# Patient Record
Sex: FEMALE | Race: WHITE | Employment: FULL TIME | URBAN - NONMETROPOLITAN AREA
[De-identification: names, ages, dates, MRNs, and addresses within clinical notes are randomized per-mention and may not be internally consistent; named-entity substitution may affect disease eponyms.]

---
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
## 2023-05-22 ENCOUNTER — OFFICE VISIT (OUTPATIENT)
Dept: URGENT CARE | Facility: PHYSICIAN GROUP | Age: 18
End: 2023-05-22
Payer: COMMERCIAL

## 2023-05-22 VITALS
DIASTOLIC BLOOD PRESSURE: 78 MMHG | HEIGHT: 62 IN | SYSTOLIC BLOOD PRESSURE: 118 MMHG | WEIGHT: 166 LBS | HEART RATE: 88 BPM | TEMPERATURE: 98 F | BODY MASS INDEX: 30.55 KG/M2 | OXYGEN SATURATION: 97 % | RESPIRATION RATE: 14 BRPM

## 2023-05-22 DIAGNOSIS — M77.8 DELTOID TENDINITIS OF LEFT SHOULDER: ICD-10-CM

## 2023-05-22 PROCEDURE — 3078F DIAST BP <80 MM HG: CPT | Performed by: NURSE PRACTITIONER

## 2023-05-22 PROCEDURE — 3074F SYST BP LT 130 MM HG: CPT | Performed by: NURSE PRACTITIONER

## 2023-05-22 PROCEDURE — 99203 OFFICE O/P NEW LOW 30 MIN: CPT | Performed by: NURSE PRACTITIONER

## 2023-05-22 RX ORDER — NAPROXEN 500 MG/1
500 TABLET ORAL 2 TIMES DAILY WITH MEALS
Qty: 30 TABLET | Refills: 0 | Status: SHIPPED | OUTPATIENT
Start: 2023-05-22 | End: 2023-05-29

## 2023-05-22 RX ORDER — LIDOCAINE 4 G/G
1 PATCH TOPICAL
Qty: 30 PATCH | Refills: 0 | Status: SHIPPED | OUTPATIENT
Start: 2023-05-22 | End: 2023-05-29

## 2023-05-22 NOTE — LETTER
[facility] [place]  [address] [facility] [address]     May 22, 2023    Patient: Cathleen Dean   YOB: 2005   Date of Visit: 5/22/2023       To Whom It May Concern:    Cathleen Dean was seen and treated in our department on 5/22/2023.     Sincerely,     TIERA Payne.

## 2023-05-22 NOTE — PROGRESS NOTES
Patient has consented to treatment and for use of patient information for treatment and billing purposes.    Chief Complaint:    Chief Complaint   Patient presents with    Shoulder Pain     X2 months R shoulder pain, thought she was just sore form work, but never got better, feels stiff, 8/10 pain, not able to lift above elbow, not going through WC         History of Present Illness: 18 y.o.  female presents to clinic with 2-month history of worsening right shoulder/deltoid pain.  States that her pain level is an 8/10, that is intermittent, and is described as tightness, sore and aching.  Pain is exacerbated with abduction of arm lateral from body, and with lifting.  She does occasionally hear a popping sound in her shoulder if she lifts it above the height of her shoulder.  She has been taking 4 to 800 mg of ibuprofen which does help with the pain intermittently.  She did go to work right and did have a lidocaine patch and taping placed which did help with her pain.  She states that this is not a work-related injury.    She denies any neck pain, any numbness or tingling running down her arm, or loss of  strength        Medications, Allergies, and current problem list reviewed today in Epic.    Physical Exam:    Vitals:    05/22/23 1110   BP: 118/78   Pulse: 88   Resp: 14   Temp: 36.7 °C (98 °F)   SpO2: 97%             Physical Exam  Constitutional:       General: She is not in acute distress.     Appearance: She is not ill-appearing or toxic-appearing.   HENT:      Head: Normocephalic.      Nose: Nose normal.      Mouth/Throat:      Mouth: Mucous membranes are moist.   Eyes:      Extraocular Movements: Extraocular movements intact.      Conjunctiva/sclera: Conjunctivae normal.      Pupils: Pupils are equal, round, and reactive to light.   Cardiovascular:      Rate and Rhythm: Normal rate.   Pulmonary:      Effort: Pulmonary effort is normal.   Musculoskeletal:         General: Normal range of motion.       Cervical back: Normal range of motion and neck supple.      Comments: Right Shoulder: decreased ROM with abduction due to pain, full strength,  strength 5/5 bilaterally, sensation intact, empty can test negative.  No pain with palpation to anterior shoulder joint or deltoid muscle.     Skin:     General: Skin is warm and dry.   Neurological:      General: No focal deficit present.      Mental Status: She is alert and oriented to person, place, and time.   Psychiatric:         Mood and Affect: Mood normal.         Behavior: Behavior normal.          Medical Decision Making and Plan:  I personally reviewed prior external notes and test results pertinent to today's visit.   Shared decision-making was utilized with patient did develop treatment plan and clinic course.     Luis Enrique, 18 y.o. female, presents to clinic today with right shoulder pain for 2 months that seems to be worsening.  Patient's physical exam is overall unremarkable.  We will go ahead and try naproxen and lidocaine.  Referral placed to physical therapy.  Referral also placed to establish with primary care provider.  Recommended cryotherapy.  Patient to continue with taping to add additional support.    Differential diagnosis, natural history, supportive care, and indications for immediate follow-up discussed.    The patient remained stable during the urgent care visit.          1. Deltoid tendinitis of left shoulder  - naproxen (NAPROSYN) 500 MG Tab; Take 1 Tablet by mouth 2 times a day with meals.  Dispense: 30 Tablet; Refill: 0  - Lidocaine 4 % Patch; Apply 1 Application topically every 24 hours as needed (Right shoulder pain).  Dispense: 30 Patch; Refill: 0  - Referral to Physical Therapy  - Referral to establish with [facility] PCP    Patient was encouraged to monitor symptoms closely. Those signs and symptoms which would warrant concern and mandate seeking a higher level of service through the emergency department discussed at length.  Patient  stated agreement and understanding of this plan of care.    Follow up:  Advised the patient to follow-up with the primary care physician for recheck, reevaluation, and consideration of further management.  Patient verbalized understanding of treatment plan and has no further questions regarding care.      Disposition:  Home in stable condition       Voice Recognition Disclaimer:  Portions of this document were created using voice recognition software. The software does have a chance of producing errors of grammar and possibly content. I have made every reasonable attempt to correct obvious errors, but there may be errors of grammar and possibly content that I did not discover before finalizing the documentation.

## 2023-05-25 ENCOUNTER — TELEPHONE (OUTPATIENT)
Dept: HEALTH INFORMATION MANAGEMENT | Facility: OTHER | Age: 18
End: 2023-05-25

## 2023-05-29 ENCOUNTER — OCCUPATIONAL MEDICINE (OUTPATIENT)
Dept: URGENT CARE | Facility: PHYSICIAN GROUP | Age: 18
End: 2023-05-29
Payer: COMMERCIAL

## 2023-05-29 VITALS
OXYGEN SATURATION: 99 % | SYSTOLIC BLOOD PRESSURE: 102 MMHG | WEIGHT: 166 LBS | DIASTOLIC BLOOD PRESSURE: 68 MMHG | RESPIRATION RATE: 14 BRPM | HEART RATE: 98 BPM | HEIGHT: 62 IN | TEMPERATURE: 97.1 F | BODY MASS INDEX: 30.55 KG/M2

## 2023-05-29 DIAGNOSIS — M77.8 DELTOID TENDINITIS OF LEFT SHOULDER: ICD-10-CM
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
## 2023-05-29 PROCEDURE — 99203 OFFICE O/P NEW LOW 30 MIN: CPT | Performed by: NURSE PRACTITIONER

## 2023-05-29 PROCEDURE — 3074F SYST BP LT 130 MM HG: CPT | Performed by: NURSE PRACTITIONER

## 2023-05-29 PROCEDURE — 3078F DIAST BP <80 MM HG: CPT | Performed by: NURSE PRACTITIONER

## 2023-05-29 RX ORDER — LIDOCAINE 50 MG/G
1 PATCH TOPICAL EVERY 24 HOURS
Qty: 10 PATCH | Refills: 0 | Status: SHIPPED | OUTPATIENT
Start: 2023-05-29 | End: 2023-06-27

## 2023-05-29 NOTE — LETTER
"EMPLOYEE’S CLAIM FOR COMPENSATION/ REPORT OF INITIAL TREATMENT  FORM C-4  PLEASE TYPE OR PRINT    EMPLOYEE’S CLAIM - PROVIDE ALL INFORMATION REQUESTED   First Name  Cathleen Last Name  [place] Birthdate                    2005                Sex  female Claim Number (Insurer’s Use Only)   Home Address  [address] Age  18 y.o. Height  1.575 m (5' 2\") Weight  75.3 kg (166 lb) [facility]     [facility] [place]  [address] Telephone  [phone] (home)    Mailing Address  [address] [facility]  [address] Primary Language Spoken  English    INSURER  na THIRD-PARTY     [employer]   Employee's Occupation (Job Title) When Injury or Occupational Disease Occurred  PRODUCTION    Employer's Name/Company Name  [employer]  Telephone  [phone]    Office Mail Address (Number and Street)  [address]        Date of Injury  3/17/2023               Hours Injury  1:30 PM Date Employer Notified  5/25/2023 Last Day of Work after Injury or Occupational Disease  5/27/2023 Supervisor to Whom Injury     Reported  ANTONIO TOLENTINO   Address or Location of Accident (if applicable)  Work [1]   What were you doing at the time of accident? (if applicable)  LIFTING A DOOR ON MY OWN    How did this injury or occupational disease occur? (Be specific and answer in detail. Use additional sheet if necessary)  WORKING WITH A MACHINE, I OPENED THE DOOR TO IT THAT SLIDES UP. THE DOOR WAS REALLY HEAVY AND I WAS LIFTING IT BY MYSELF   If you believe that you have an occupational disease, when did you first have knowledge of the disability and its relationship to your employment?  NA Witnesses to the Accident (if applicable)  NA      Nature of Injury or Occupational Disease  Workers' Compensation  Part(s) of Body Injured or Affected  Shoulder (R), Defer, Defer    I CERTIFY THAT THE ABOVE IS TRUE AND CORRECT TO T HE BEST OF MY " KNOWLEDGE AND THAT I HAVE PROVIDED THIS INFORMATION IN ORDER TO OBTAIN THE BENEFITS OF [place], OR [place]).  I HEREBY AUTHORIZE ANY PHYSICIAN, CHIROPRACTOR, SURGEON, PRACTITIONER OR ANY OTHER PERSON, ANY HOSPITAL, INCLUDING [facility] OR [facility], ANY  MEDICAL SERVICE ORGANIZATION, ANY INSURANCE COMPANY, OR OTHER INSTITUTION OR ORGANIZATION TO RELEASE TO EACH OTHER, ANY MEDICAL OR OTHER INFORMATION, INCLUDING BENEFITS PAID OR PAYABLE, PERTINENT TO THIS INJURY OR DISEASE, EXCEPT INFORMATION RELATIVE TO DIAGNOSIS, TREATMENT AND/OR COUNSELING FOR AIDS, PSYCHOLOGICAL CONDITIONS, ALCOHOL OR CONTROLLED SUBSTANCES, FOR WHICH I MUST GIVE SPECIFIC AUTHORIZATION.  A PHOTOSTAT OF THIS AUTHORIZATION SHALL BE VALID AS THE ORIGINAL.       Date  05/29/2023   [facility] Employee’s Original or  *Electronic Signature   THIS REPORT MUST BE COMPLETED AND MAILED WITHIN 3 WORKING DAYS OF TREATMENT   [facility] [place]  Name of Facility  [facility]   Date  5/29/2023 Diagnosis and Description of Injury or Occupational Disease  (M77.8) Deltoid tendinitis of left shoulder  (Y99.0) Work related injury Is there evidence that the injured employee was under the influence of alcohol and/or another controlled substance at the time of accident?  ? No ? Yes (if yes, please explain)   Hour  10:43 AM   Diagnoses of Deltoid tendinitis of left shoulder and Work related injury were pertinent to this visit. No   Treatment  RICE, alternating lidocaine patch with Voltaren gel.  Recommend gentle range of motion exercises as tolerated.  Referral to occupational health due to prolonged injury.  Have you advised the patient to remain off work five days or     more?    X-Ray Findings      ? Yes Indicate dates:   From   To      From information given by the employee, together with medical evidence, can        you directly  "connect this injury or occupational disease as job incurred?  Yes ? No If no, is the injured employee capable of:  ? full duty  No ? modified duty      Is additional medical care by a physician indicated?  Yes If modified duty, specify any limitations / restrictions  See D39   Do you know of any previous injury or disease contributing to this condition or occupational disease?  ? Yes ? No (Explain if yes)                          No   Date  5/29/2023 Print Health Care Provider's  Name  FOX Armstrong I certify that the employer’s copy of  this form was delivered to the employer on:   Address  [address] Insurer’s Use Only     [place] Zip  [address]    Provider’s Tax ID Number  [id number] Telephone  Dept: [phone]             Health Care Provider’s Original or Electronic Signature  e-JESSICA Huynh Degree (MD,DO, DC,PA-C,APRN)  APRN      * Complete and attach Release of Information (Form C-4A) when injured employee signs C-4 Form electronically  ORIGINAL - TREATING HEALTHCARE PROVIDER PAGE 2 - INSURER/TPA PAGE 3 - EMPLOYER PAGE 4 - EMPLOYEE             Form C-4 (rev.08/21)           BRIEF DESCRIPTION OF RIGHTS AND BENEFITS  (Pursuant to [place] 616C.050)    Notice of Injury or Occupational Disease (Incident Report Form C-1): If an injury or occupational disease (OD) arises out of and in the course of employment, you must provide written notice to your employer as soon as practicable, but no later than 7 days after the accident or OD. Your employer shall maintain a sufficient supply of the required forms.    Claim for Compensation (Form C-4): If medical treatment is sought, the form C-4 is available at the place of initial treatment. A completed \"Claim for Compensation\" (Form C-4) must be filed within 90 days after an accident or OD. The treating physician or chiropractor must, within 3 working days after treatment, complete and mail to the employer, the employer's " insurer and third-party , the Claim for Compensation.    Medical Treatment: If you require medical treatment for your on-the-job injury or OD, you may be required to select a physician or chiropractor from a list provided by your workers’ compensation insurer, if it has contracted with an Organization for Managed Care (MCO) or Preferred Provider Organization (PPO) or providers of health care. If your employer has not entered into a contract with an MCO or PPO, you may select a physician or chiropractor from the Panel of Physicians and Chiropractors. Any medical costs related to your industrial injury or OD will be paid by your insurer.    Temporary Total Disability (TTD): If your doctor has certified that you are unable to work for a period of at least 5 consecutive days, or 5 cumulative days in a 20-day period, or places restrictions on you that your employer does not accommodate, you may be entitled to TTD compensation.    Temporary Partial Disability (TPD): If the wage you receive upon reemployment is less than the compensation for TTD to which you are entitled, the insurer may be required to pay you TPD compensation to make up the difference. TPD can only be paid for a maximum of 24 months.    Permanent Partial Disability (PPD): When your medical condition is stable and there is an indication of a PPD as a result of your injury or OD, within 30 days, your insurer must arrange for an evaluation by a rating physician or chiropractor to determine the degree of your PPD. The amount of your PPD award depends on the date of injury, the results of the PPD evaluation, your age and wage.    Permanent Total Disability (PTD): If you are medically certified by a treating physician or chiropractor as permanently and totally disabled and have been granted a PTD status by your insurer, you are entitled to receive monthly benefits not to exceed 66 2/3% of your average monthly wage. The amount of your PTD payments  is subject to reduction if you previously received a lump-sum PPD award.    Vocational Rehabilitation Services: You may be eligible for vocational rehabilitation services if you are unable to return to the job due to a permanent physical impairment or permanent restrictions as a result of your injury or occupational disease.    Transportation and Per Igor Reimbursement: You may be eligible for travel expenses and per igor associated with medical treatment.    Reopening: You may be able to reopen your claim if your condition worsens after claim closure.     Appeal Process: If you disagree with a written determination issued by the insurer or the insurer does not respond to your request, you may appeal to the Department of Administration, , by following the instructions contained in your determination letter. You must appeal the determination within 70 days from the date of the determination letter at [address], [address], [place], [place] [address], or [address], [address], [place], [place] [address]. If you disagree with the  decision, you may appeal to the Department of Administration, . You must file your appeal within 30 days from the date of the  decision letter at [address], [address], [place], [place] [address], or [address], [address], [place], [place] [address]. If you disagree with a decision of an , you may file a petition for judicial review with the District Court. You must do so within 30 days of the Appeal Officer’s decision. You may be represented by an  at your own expense or you may contact the [facility] for possible representation.    [place]  for Injured Workers (NAIW): If you disagree with a  decision, you may request that NAIW represent you without charge at an  Hearing. For information regarding denial of benefits, you may contact the [facility]  at: [address], [address], [place] [address], [phone], or [address] [facility], [address], [place] [address], [phone]    To File a Complaint with the Division: If you wish to file a complaint with the  of the Division of Industrial Relations (DIR),  please contact the Workers’ Compensation Section, [address], [address], [place], [place] [address], telephone [phone], or [address], [address], [place], [place] [address], telephone [phone].    For assistance with Workers’ Compensation Issues: You may contact the [facility] Office for Consumer Health Assistance, [address], [facility] [address], [place] [address], Toll Free 1-478.317.2287, Web site: http://[facility].[place].gov/Programs/PETRA E-mail: petra@[facility].nv.gov              05/29/2023            __________________________________________________________________                                    _________________            Employee Name / Signature                                                                                                                            Date                                                                                                                                                                                                                              D-2 (rev. 10/20)

## 2023-05-29 NOTE — LETTER
[facility]  [place] [address]  Phone:  [phone] - Fax:  [phone]   [facility] Progress Report and Disability Certification  Date of Service: 5/29/2023   No Show:  No  Date / Time of Next Visit: 6/9/2023   Claim Information   Patient Name: Cathleen Dean  Claim Number:     Employer: [employer]  Date of Injury: 3/17/2023     Insurer / TPA: [employer]  ID / SSN:     Occupation: PRODUCTION  Diagnosis: Diagnoses of Deltoid tendinitis of left shoulder and Work related injury were pertinent to this visit.    Medical Information   Related to Industrial Injury? Yes    Subjective Complaints:  DOI: approx 2-3 months ago:  First visit on 5/23: (did not claim WC at this time)  18 y.o.  female presents to clinic with 2-month history of worsening right shoulder/deltoid pain.  States that her pain level is an 8/10, that is intermittent, and is described as tightness, sore and aching.  Pain is exacerbated with abduction of arm lateral from body, and with lifting.  She does occasionally hear a popping sound in her shoulder if she lifts it above the height of her shoulder.  She has been taking 4 to 800 mg of ibuprofen which does help with the pain intermittently.  She did go to work right and did have a lidocaine patch and taping placed which did help with her pain.  She states that this is not a work-related injury.  She denies any neck pain, any numbness or tingling running down her arm, or loss of  strength.    FUV (claiming worker's comp) on 5/29: Patient returns for follow-up office visit.  States that this is a workers comp injury, due to lifting up a heavy door without assistance repeatedly during several of her work shifts.  States that her pain is approximately the same since his visit on 5/23.  Reports worsening pain with forward flexion and abduction.  Tried Salonpas patch which was not helpful, reports that naproxen made her nauseated  despite eating while taking medication.  Used a TENS unit at home yesterday, with moderate relief.  Has been working light duty at work, and tolerating it well.  Has had prior referral to physical therapy.  Denies numbness, tingling, loss of sensation, decreased  strength.  Patient is right-handed.    PMH: No pertinent past medical history to this problem   MEDS: Medications were reviewed in Epic   ALLERGIES: Allergies were reviewed in Epic   SOCHX: Reviewed in Epic  FH: No pertinent family history to this problem        Objective Findings: Right Shoulder: No visible or palpable signs of deformity; decreased ROM with forward flexion, lateral abduction due to pain; full strength 5/5 bilaterally, sensation intact, minimal pain with palpation to anterior shoulder joint and deltoid muscle   Pre-Existing Condition(s):     Assessment:   Initial Visit    Status: Additional Care Required  Permanent Disability:No    Plan: Medication    Diagnostics:      Comments:       Disability Information   Status: Released to Restricted Duty    From:  5/29/2023  Through: 6/9/2023 Restrictions are: Temporary   Physical Restrictions   Sitting:    Standing:    Stooping:    Bending:      Squatting:    Walking:    Climbing:    Pushing:  < or = to 2 hrs/day   Pulling:  < or = to 2 hrs/day Other:    Reaching Above Shoulder (L):   Reaching Above Shoulder (R):       Reaching Below Shoulder (L):    Reaching Below Shoulder (R):  < or = to 1 hrs/day   Not to exceed Weight Limits   Carrying(hrs):   Weight Limit(lb): < or = to 10 pounds Lifting(hrs):   Weight  Limit(lb): < or = to 10 pounds   Comments: Refer to [facility] ([place]). Restriction applied to right upper extremity only.  Trial lidocaine patch alternating with Voltaren gel.  Recommend OTC Tylenol and gentle range of motion exercises as tolerated.    Repetitive Actions   Hands: i.e. Fine Manipulations from Grasping:     Feet: i.e. Operating Foot Controls:     Driving / Operate Machinery:      Health Care Provider’s Original or Electronic Signature  TIERA Armstrong. Health Care Provider’s Original or Electronic Signature    Justen Clifford DO MPH     Clinic Name / Location: [address] [address] Clinic Phone Number: Dept: [phone]   Appointment Time: 10:30 Am Visit Start Time: 10:43 AM   Check-In Time:  10:21 Am Visit Discharge Time:  11:37 am    Original-Treating Physician or Chiropractor    Page 2-Insurer/TPA    Page 3-Employer    Page 4-Employee

## 2023-05-29 NOTE — PROGRESS NOTES
Subjective:   Cathleen Dean is a 18 y.o. female who presents for Shoulder Injury (WC follow up, R shoulder seems to be worse, 9/10 pain more constant, pain radiates down whole arm at times and sometimes just in the shoulder, injury happened while lifting heavy door, not able to raise arm up, stopped taking naproxen as it made her nauseous  but did not seem to help with pain.)      HPI  DOI: approx 2-3 months ago:  First visit on 5/23: (did not claim  at this time)  18 y.o.  female presents to clinic with 2-month history of worsening right shoulder/deltoid pain.  States that her pain level is an 8/10, that is intermittent, and is described as tightness, sore and aching.  Pain is exacerbated with abduction of arm lateral from body, and with lifting.  She does occasionally hear a popping sound in her shoulder if she lifts it above the height of her shoulder.  She has been taking 4 to 800 mg of ibuprofen which does help with the pain intermittently.  She did go to work right and did have a lidocaine patch and taping placed which did help with her pain.  She states that this is not a work-related injury.  She denies any neck pain, any numbness or tingling running down her arm, or loss of  strength.    FUV (claiming worker's comp) on 5/29: Patient returns for follow-up office visit.  States that this is a workers comp injury, due to lifting up a heavy door without assistance repeatedly during several of her work shifts.  States that her pain is approximately the same since his visit on 5/23.  Reports worsening pain with forward flexion and abduction.  Tried Salonpas patch which was not helpful, reports that naproxen made her nauseated despite eating while taking medication.  Used a TENS unit at home yesterday, with moderate relief.  Has been working light duty at work, and tolerating it well.  Has had prior referral to physical therapy.  Denies numbness, tingling, loss of sensation, decreased  strength.  Patient  "is right-handed.    PMH: No pertinent past medical history to this problem   MEDS: Medications were reviewed in Epic   ALLERGIES: Allergies were reviewed in Epic   SOCHX: Reviewed in Epic  FH: No pertinent family history to this problem       ROS  All other systems are negative except as documented above within HPI.    MEDS:   Current Outpatient Medications:     lidocaine (LIDODERM) 5 % Patch, Place 1 Patch on the skin every 24 hours., Disp: 10 Patch, Rfl: 0    diclofenac sodium (VOLTAREN) 1 % Gel, Apply 2 g topically 1 time a day as needed (pain) for up to 14 days., Disp: 100 g, Rfl: 0  ALLERGIES: No Known Allergies    Patient's PMH, SocHx, SurgHx, FamHx, Drug allergies and medications were reviewed.     Objective:   /68   Pulse 98   Temp 36.2 °C (97.1 °F) (Temporal)   Resp 14   Ht 1.575 m (5' 2\")   Wt 75.3 kg (166 lb)   SpO2 99%   BMI 30.36 kg/m²     Physical Exam  Vitals and nursing note reviewed.   Constitutional:       General: She is awake.      Appearance: Normal appearance. She is well-developed.   HENT:      Head: Normocephalic and atraumatic.      Right Ear: External ear normal.      Left Ear: External ear normal.      Nose: Nose normal.      Mouth/Throat:      Mouth: Mucous membranes are moist.      Pharynx: Oropharynx is clear.   Eyes:      Extraocular Movements: Extraocular movements intact.      Conjunctiva/sclera: Conjunctivae normal.   Cardiovascular:      Rate and Rhythm: Normal rate and regular rhythm.   Pulmonary:      Effort: Pulmonary effort is normal.      Breath sounds: Normal breath sounds.   Musculoskeletal:         General: Normal range of motion.      Cervical back: Normal range of motion and neck supple.      Comments: Right Shoulder: No visible or palpable signs of deformity; decreased ROM with forward flexion, lateral abduction due to pain; full strength 5/5 bilaterally, sensation intact, minimal pain with palpation to anterior shoulder joint and deltoid muscle   Skin:     " General: Skin is warm and dry.   Neurological:      Mental Status: She is alert and oriented to person, place, and time.   Psychiatric:         Mood and Affect: Mood normal.         Behavior: Behavior normal.         Thought Content: Thought content normal.         Assessment/Plan:   Assessment    1. Deltoid tendinitis of left shoulder  - lidocaine (LIDODERM) 5 % Patch; Place 1 Patch on the skin every 24 hours.  Dispense: 10 Patch; Refill: 0  - diclofenac sodium (VOLTAREN) 1 % Gel; Apply 2 g topically 1 time a day as needed (pain) for up to 14 days.  Dispense: 100 g; Refill: 0  - Referral to Occupational Medicine    2. Work related injury      See D39.  Trial lidocaine patch, alternate with the Voltaren gel.  Continuation of TENS unit, which she previously had at home.  Recommend gentle range of motion exercises as tolerated.  Referral to occupational health in [place].        Please note that this dictation was created using voice recognition software. I have made a reasonable attempt to correct obvious errors, but I expect that there are errors of grammar and possibly content that I did not discover before finalizing the note.

## 2023-05-29 NOTE — LETTER
[facility]  Tushar Wheatley  [place] [address]  Phone:  [phone] - Fax:  [phone]   [facility] Progress Report and Disability Certification  Date of Service: 5/29/2023   No Show:  No  Date / Time of Next Visit:     Claim Information   Patient Name: Cathleen Dean  Claim Number:     Employer: [employer]  Date of Injury: 3/17/2023     Insurer / TPA: [employer]  ID / SSN:     Occupation: PRODUCTION  Diagnosis: Diagnoses of Deltoid tendinitis of left shoulder and Work related injury were pertinent to this visit.    Medical Information   Related to Industrial Injury? Yes    Subjective Complaints:  DOI: approx 2-3 months ago:  First visit on 5/23: (did not claim WC at this time)  18 y.o.  female presents to clinic with 2-month history of worsening right shoulder/deltoid pain.  States that her pain level is an 8/10, that is intermittent, and is described as tightness, sore and aching.  Pain is exacerbated with abduction of arm lateral from body, and with lifting.  She does occasionally hear a popping sound in her shoulder if she lifts it above the height of her shoulder.  She has been taking 4 to 800 mg of ibuprofen which does help with the pain intermittently.  She did go to work right and did have a lidocaine patch and taping placed which did help with her pain.  She states that this is not a work-related injury.  She denies any neck pain, any numbness or tingling running down her arm, or loss of  strength.    FUV (claiming worker's comp) on 5/29: Patient returns for follow-up office visit.  States that this is a workers comp injury, due to lifting up a heavy door without assistance repeatedly during several of her work shifts.  States that her pain is approximately the same since his visit on 5/23.  Reports worsening pain with forward flexion and abduction.  Tried Salonpas patch which was not helpful, reports that naproxen made her nauseated despite  eating while taking medication.  Used a TENS unit at home yesterday, with moderate relief.  Has been working light duty at work, and tolerating it well.  Has had prior referral to physical therapy.  Denies numbness, tingling, loss of sensation, decreased  strength.  Patient is right-handed.    PMH: No pertinent past medical history to this problem   MEDS: Medications were reviewed in Epic   ALLERGIES: Allergies were reviewed in Epic   SOCHX: Reviewed in Epic  FH: No pertinent family history to this problem        Objective Findings: Right Shoulder: No visible or palpable signs of deformity; decreased ROM with forward flexion, lateral abduction due to pain; full strength 5/5 bilaterally, sensation intact, minimal pain with palpation to anterior shoulder joint and deltoid muscle   Pre-Existing Condition(s):     Assessment:   Initial Visit    Status: Additional Care Required  Permanent Disability:No    Plan: Medication    Diagnostics:      Comments:       Disability Information   Status: Released to Restricted Duty    From:     Through:   Restrictions are:     Physical Restrictions   Sitting:    Standing:    Stooping:    Bending:      Squatting:    Walking:    Climbing:    Pushing:  < or = to 2 hrs/day   Pulling:  < or = to 2 hrs/day Other:    Reaching Above Shoulder (L):   Reaching Above Shoulder (R):       Reaching Below Shoulder (L):    Reaching Below Shoulder (R):  < or = to 1 hrs/day   Not to exceed Weight Limits   Carrying(hrs):   Weight Limit(lb): < or = to 10 pounds Lifting(hrs):   Weight  Limit(lb): < or = to 10 pounds   Comments: Restriction applied to right upper extremity only.  Trial lidocaine patch alternating with Voltaren gel.  Recommend OTC Tylenol and gentle range of motion exercises as tolerated.    Repetitive Actions   Hands: i.e. Fine Manipulations from Grasping:     Feet: i.e. Operating Foot Controls:     Driving / Operate Machinery:     Health Care Provider’s Original or Electronic  Signature  NESTOR ArmstrongP.R.N. Health Care Provider’s Original or Electronic Signature    Justen Clifford DO MPH     Clinic Name / Location: [address] [address] Clinic Phone Number: Dept: [phone]   Appointment Time: 10:30 Am Visit Start Time: 10:43 AM   Check-In Time:  10:21 Am Visit Discharge Time:     Original-Treating Physician or Chiropractor    Page 2-Insurer/TPA    Page 3-Employer    Page 4-Employee

## 2023-06-05 ENCOUNTER — OCCUPATIONAL MEDICINE (OUTPATIENT)
Dept: OCCUPATIONAL MEDICINE | Facility: CLINIC | Age: 18
End: 2023-06-05
Payer: COMMERCIAL

## 2023-06-05 VITALS
DIASTOLIC BLOOD PRESSURE: 74 MMHG | WEIGHT: 166 LBS | BODY MASS INDEX: 30.55 KG/M2 | HEART RATE: 85 BPM | SYSTOLIC BLOOD PRESSURE: 118 MMHG | HEIGHT: 62 IN | OXYGEN SATURATION: 100 % | TEMPERATURE: 98.4 F | RESPIRATION RATE: 14 BRPM

## 2023-06-05 DIAGNOSIS — S46.911D STRAIN OF RIGHT SHOULDER, SUBSEQUENT ENCOUNTER: ICD-10-CM

## 2023-06-05 DIAGNOSIS — M77.8 DELTOID TENDINITIS OF RIGHT SHOULDER: ICD-10-CM

## 2023-06-05 PROCEDURE — 99213 OFFICE O/P EST LOW 20 MIN: CPT | Performed by: NURSE PRACTITIONER

## 2023-06-05 PROCEDURE — 3078F DIAST BP <80 MM HG: CPT | Performed by: NURSE PRACTITIONER

## 2023-06-05 PROCEDURE — 3074F SYST BP LT 130 MM HG: CPT | Performed by: NURSE PRACTITIONER

## 2023-06-05 NOTE — LETTER
[address],   [address] [place] [address]  Phone:  [phone] - Fax:  [phone]   Occupational Health [facility] Progress Report and Disability Certification  Date of Service: 6/5/2023   No Show:  No  Date / Time of Next Visit: 6/26/2023 @ 1:30 PM   Claim Information   Patient Name: Cathleen Dean  Claim Number:     Employer: [employer]  Date of Injury: 3/17/2023     Insurer / TPA: [employer]  ID / SSN:     Occupation: PRODUCTION  Diagnosis: Diagnoses of Deltoid tendinitis of right shoulder and Strain of right shoulder, subsequent encounter were pertinent to this visit.    Medical Information   Related to Industrial Injury? Yes    Subjective Complaints:  DOI: approx 2-3 months ago: PAT: Patient was working on a machine she opened the door to get that slides up the door was really heavy and she was lifting it by herself.  Today she reports that symptoms have remained unchanged.  She states it has improved a little but only because she has been resting.  She states that there is a tightness, soreness, achy, pain with any repetitive movement.  Pain is exacerbated with abduction of arm lateral from body, and with lifting.  She does occasionally hear a popping sound in her shoulder if she lifts it above the height of her shoulder.  She denies numbness and tingling.  She is taking OTC ibuprofen.  She has been doing ice, heat, KT tape, and a TENS unit.  She states that these treatments work only for brief time but the symptoms always return.  She has not been back to work as they were unable to accommodate the restrictions at this time.  MRI and physical therapy referral placed.  Plan of care discussed with patient.   Objective Findings: Right shoulder: No gross deformity or discoloration noted.  There is popping appreciated at the ACJ.  No TTP to deep palpation.  Positive Apley scratch and empty can test.   strength 5/5.  Distal neurovascular strength and  sensation intact.   Pre-Existing Condition(s):     Assessment:   Condition Same    Status: Additional Care Required  Permanent Disability:No    Plan: PTDiagnostics    Diagnostics: MRI    Comments:  Follow-up in 3 weeks  Restricted duty  MRI ordered   Physical therapy referral placed  Recommend continue with OTC ibuprofen, ice/heat application, TENS unit, and OTC topical ointment of your choosing i.e. Tiger balm, Voltaren gel, or Biofreeze  Recommend wall walks and pendulum swings as demonstrated    Disability Information   Status: Released to Restricted Duty    From:  6/5/2023  Through: 6/26/2023 Restrictions are: Temporary   Physical Restrictions   Sitting:    Standing:    Stooping:    Bending:      Squatting:    Walking:    Climbing:    Pushing:      Pulling:    Other:    Reaching Above Shoulder (L):   Reaching Above Shoulder (R): < or = 1 hrs/day     Reaching Below Shoulder (L):    Reaching Below Shoulder (R):  < or = to 1 hrs/day   Not to exceed Weight Limits   Carrying(hrs):   Weight Limit(lb): < or = to 10 pounds Lifting(hrs):   Weight  Limit(lb): < or = to 10 pounds   Comments: No lifting, pushing, or pulling more than 10lbs with right upper extremity      Repetitive Actions   Hands: i.e. Fine Manipulations from Grasping:     Feet: i.e. Operating Foot Controls:     Driving / Operate Machinery:     Health Care Provider’s Original or Electronic Signature  FOX Prakash Health Care Provider’s Original or Electronic Signature    Justen Clifford DO MPH     Clinic Name / Location: [address] [address] Clinic Phone Number: Dept: [phone]   Appointment Time: 8:15 Am Visit Start Time: 7:48 AM   Check-In Time:  7:34 Am Visit Discharge Time:  8:49 PM   Original-Treating Physician or Chiropractor    Page 2-Insurer/TPA    Page 3-Employer    Page 4-Employee

## 2023-06-05 NOTE — PROGRESS NOTES
"Subjective:     Cathleen Dean is a 18 y.o. 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  Today she reports that symptoms have remained unchanged.  She states it has improved a little but only because she has been resting.  She states that there is a tightness, soreness, achy, pain with any repetitive movement.  Pain is exacerbated with abduction of arm lateral from body, and with lifting.  She does occasionally hear a popping sound in her shoulder if she lifts it above the height of her shoulder.  She denies numbness and tingling.  She is taking OTC ibuprofen.  She has been doing ice, heat, KT tape, and a TENS unit.  She states that these treatments work only for brief time but the symptoms always return.  She has not been back to work as they were unable to accommodate the restrictions at this time.  MRI and physical therapy referral placed.  Plan of care discussed with patient.    ROS: All systems were reviewed on intake form, form was reviewed and signed. See scanned documents in media. Pertinent positives and negatives included in HPI.    PMH: No pertinent past medical history to this problem  MEDS: Medications were reviewed in Epic  ALLERGIES: No Known Allergies  SOCHX: Works as  at "[employer]  for the past 7-8 months  FH: No pertinent family history to this problem       Objective:     /74   Pulse 85   Temp 36.9 °C (98.4 °F) (Temporal)   Resp 14   Ht 1.575 m (5' 2\")   Wt 75.3 kg (166 lb)   SpO2 100%   BMI 30.36 kg/m²     [unfilled]    Right shoulder: No gross deformity or discoloration noted.  There is popping appreciated at the ACJ.  No TTP to deep palpation.  Positive Apley scratch and empty can test.   strength 5/5.  Distal neurovascular strength and sensation " intact.    Assessment/Plan:       1. Deltoid tendinitis of right shoulder  - MR-SHOULDER-W/O RIGHT; Future  - Referral to Radiology    2. Strain of right shoulder, subsequent encounter  - MR-SHOULDER-W/O RIGHT; Future  - Referral to Radiology    Released to Restricted Duty FROM 6/5/2023 TO 6/26/2023  No lifting, pushing, or pulling more than 10lbs with right upper extremity    Follow-up in 3 weeks  Restricted duty  MRI ordered   Physical therapy referral placed  Recommend continue with OTC ibuprofen, ice/heat application, TENS unit, and OTC topical ointment of your choosing i.e. Tiger balm, Voltaren gel, or Biofreeze  Recommend wall walks and pendulum swings as demonstrated    Differential diagnosis, natural history, supportive care, and indications for immediate follow-up discussed.    Approximately 25 minutes were spent in reviewing notes, preparing for visit, obtaining history, exam and evaluation, patient counseling/education and post visit documentation/orders.

## 2023-06-27 ENCOUNTER — OFFICE VISIT (OUTPATIENT)
Dept: URGENT CARE | Facility: PHYSICIAN GROUP | Age: 18
End: 2023-06-27
Payer: COMMERCIAL

## 2023-06-27 VITALS
RESPIRATION RATE: 16 BRPM | HEART RATE: 99 BPM | OXYGEN SATURATION: 95 % | WEIGHT: 165 LBS | SYSTOLIC BLOOD PRESSURE: 98 MMHG | BODY MASS INDEX: 29.23 KG/M2 | DIASTOLIC BLOOD PRESSURE: 62 MMHG | TEMPERATURE: 97.5 F | HEIGHT: 63 IN

## 2023-06-27 DIAGNOSIS — M77.8 DELTOID TENDINITIS OF LEFT SHOULDER: ICD-10-CM

## 2023-06-27 PROCEDURE — 3074F SYST BP LT 130 MM HG: CPT | Performed by: NURSE PRACTITIONER

## 2023-06-27 PROCEDURE — 3078F DIAST BP <80 MM HG: CPT | Performed by: NURSE PRACTITIONER

## 2023-06-27 PROCEDURE — 99213 OFFICE O/P EST LOW 20 MIN: CPT | Performed by: NURSE PRACTITIONER

## 2023-06-27 NOTE — LETTER
[facility] URGENT CARE [place] [address] [address]  [facility] [address]     June 27, 2023    Patient: Cathleen Dean   YOB: 2005   Date of Visit: 6/27/2023       To Whom It May Concern:    Cathleen Dean was seen and treated in our department on 6/27/2023. No lifting, pushing, or pulling more than 10lbs with right upper extremity.  Until reevaluated by primary care provider.    Sincerely,     TIERA Payne.

## 2023-06-27 NOTE — PROGRESS NOTES
Subjective:   Cathleen Dean is a 18 y.o. female who presents for Shoulder Injury (X2 months R Shoulder Inj, got denied for WC, still having R shoulder pain, needs note for work with lift restrictions, hurts to lift more then 10#. )    Patient is a 18-year-old female who presents to clinic with 2 to 3-month history of ongoing right shoulder pain.  Patient states that she has been seen in the urgent care setting on May 25 and was given naproxen and physical therapy however the naproxen did not agree with her and she did not go to physical therapy.  She was seen again on May 29 through Worker's Comp. claim due to right shoulder pain and was referred to occupational medicine which she then followed up on 6/5/2023.  During the occupational medicine appointment she was ordered a MRI and physical therapy her reports that she was told yesterday that her claim has been denied and she was not able to obtain her MRI or get physical therapy approved.    Today she states that her right shoulder pain has improved with range of motion and gentle stretching exercises as recommended by occupational medicine.  She states that her pain level is a 2/10 aching pain that is exacerbated with pushing, pulling, and lifting.  She is not currently needing any over-the-counter medications or lidocaine patches.  She does continue to use cryotherapy in the TENS machine and does find relief in her symptoms.  She states she does not currently have a primary care provider.  She does report that she occasionally hears an auditory popping sound in her right shoulder.  She denies any right arm radiculopathy, loss of strength, and states that she does have full range of motion now.  Patient is requesting a work note to continue with work restrictions as recommended by occupational medicine.      Medications, Allergies, and current problem list reviewed today in Epic.     Objective:     BP 98/62   Pulse 99   Temp 36.4 °C (97.5 °F) (Temporal)   Resp  "16   Ht 1.6 m (5' 3\")   Wt 74.8 kg (165 lb)   SpO2 95%     Physical Exam  Vitals reviewed.   Constitutional:       Appearance: Normal appearance.   HENT:      Head: Normocephalic.      Nose: Nose normal.      Mouth/Throat:      Mouth: Mucous membranes are moist.   Eyes:      Extraocular Movements: Extraocular movements intact.      Conjunctiva/sclera: Conjunctivae normal.      Pupils: Pupils are equal, round, and reactive to light.   Cardiovascular:      Rate and Rhythm: Normal rate.   Pulmonary:      Effort: Pulmonary effort is normal.   Musculoskeletal:         General: Normal range of motion.      Cervical back: Normal range of motion and neck supple.      Comments: Right Shoulder: Full ROM, full strength, empty can test negative.  Negative for crepitus.  Nontender along anterior joint line.   strength 5 out of 5.  Distal neurovascular and sensation intact.   Skin:     General: Skin is warm and dry.   Neurological:      Mental Status: She is alert and oriented to person, place, and time.   Psychiatric:         Mood and Affect: Mood normal.         Behavior: Behavior normal.         Thought Content: Thought content normal.         Judgment: Judgment normal.         Assessment/Plan:     Diagnosis and associated orders:     1. Deltoid tendinitis of left shoulder  Referral to establish with [facility] PCP         Comments/MDM:     I personally reviewed previous urgent care and occupational medicine notes and discussed with patient and answered all questions.  Patient does continue to have mild right shoulder pain reported with intermittent popping.  We will go ahead and continue on previous work restrictions indicating no pushing, pulling, or lifting greater than 10 pounds.  Work note provided.    Referral placed to establish with primary care provider.  Patient will follow-up with previous physical therapy orders.  May continue with Tylenol, Motrin, and cryotherapy as needed.         Differential diagnosis, " natural history, supportive care, and indications for immediate follow-up discussed.    Advised the patient to follow-up with the primary care physician for recheck, reevaluation, and consideration of further management.    Please note that this dictation was created using voice recognition software. I have made a reasonable attempt to correct obvious errors, but I expect that there are errors of grammar and possibly content that I did not discover before finalizing the note.

## 2023-07-10 ENCOUNTER — OFFICE VISIT (OUTPATIENT)
Dept: MEDICAL GROUP | Facility: CLINIC | Age: 18
End: 2023-07-10
Attending: NURSE PRACTITIONER
Payer: COMMERCIAL

## 2023-07-10 VITALS
OXYGEN SATURATION: 96 % | TEMPERATURE: 97.8 F | WEIGHT: 168 LBS | HEART RATE: 98 BPM | BODY MASS INDEX: 31.72 KG/M2 | SYSTOLIC BLOOD PRESSURE: 106 MMHG | DIASTOLIC BLOOD PRESSURE: 72 MMHG | RESPIRATION RATE: 16 BRPM | HEIGHT: 61 IN

## 2023-07-10 DIAGNOSIS — E55.9 VITAMIN D DEFICIENCY: ICD-10-CM

## 2023-07-10 DIAGNOSIS — Z00.00 ROUTINE PHYSICAL EXAMINATION: ICD-10-CM

## 2023-07-10 DIAGNOSIS — M77.8 DELTOID TENDONITIS OF RIGHT SHOULDER: ICD-10-CM

## 2023-07-10 DIAGNOSIS — S46.911D STRAIN OF RIGHT SHOULDER, SUBSEQUENT ENCOUNTER: ICD-10-CM

## 2023-07-10 DIAGNOSIS — E66.09 CLASS 1 OBESITY DUE TO EXCESS CALORIES WITHOUT SERIOUS COMORBIDITY WITH BODY MASS INDEX (BMI) OF 31.0 TO 31.9 IN ADULT: ICD-10-CM

## 2023-07-10 PROBLEM — S46.911A STRAIN OF RIGHT SHOULDER: Status: ACTIVE | Noted: 2023-07-10

## 2023-07-10 PROCEDURE — 99213 OFFICE O/P EST LOW 20 MIN: CPT | Performed by: PHYSICIAN ASSISTANT

## 2023-07-10 PROCEDURE — 3074F SYST BP LT 130 MM HG: CPT | Performed by: PHYSICIAN ASSISTANT

## 2023-07-10 PROCEDURE — 3078F DIAST BP <80 MM HG: CPT | Performed by: PHYSICIAN ASSISTANT

## 2023-07-10 ASSESSMENT — PATIENT HEALTH QUESTIONNAIRE - PHQ9: CLINICAL INTERPRETATION OF PHQ2 SCORE: 0

## 2023-07-10 NOTE — PROGRESS NOTES
Chief Complaint   Patient presents with    Establish Care     FMLA paperwork and right shoulder injury f/u        HPI:  Cathleen is a 18 y.o. female new patient presenting to [facility] and discuss the following:    Assessment/Plan:     Routine physical examination  Patient here today to [facility]. Due for routine fasting labs. Will follow up in 2 weeks with results.  Patient states no previous PCP. No medical records requested.    Strain of right shoulder  Patient was initially seen for this condition on 5/22/2023 in the urgent care.  At that time she stated that this was not a Workmen's Comp. issue.  She states that this pain of her right shoulder started approximately 03/23 after she was working on a machine where the door slides up. The door was really heavy and she was lifting it by herself. She states the pain has improved but only because she has been resting.  She states that there was a tightness, soreness, achy, pain with any repetitive movement.  Pain was exacerbated with abduction of arm lateral from body, and with lifting.  She does occasionally hear a popping sound in her shoulder still if she lifts it above the height of her shoulder.  She denies numbness and tingling.  She is taking OTC ibuprofen.  She has been doing ice, heat, KT tape, and a TENS unit. Patient states that she has not returned to work since 05/25/23. She brings in [facility] paperwork for completion today. She states that her pain is much better than it was in May but that she still gets pain when lifting heavy things, such as her nephew who weighs 65 pounds. She did not attend the physical therapy that was prescribed because the Workmen's Comp. claim was denied and she was afraid that the insurance would not cover physical therapy.  I have instructed her to call today and make the appointment for physical therapy as it will be covered by her insurance.  We will release her back to work with a 50 pound weight restriction.  With  instructions that anything over 50 pounds requires a team lift for her.  She states she should be able to go back to work with this accommodation in her regular work area.  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  She continues to take ibuprofen to help control her discomfort.  Shoulder exam today was unable to reproduce her previous pain.  Her strength is 5/5 bilaterally.  This information has been noted on her [facility] paperwork along with her need to complete her physical therapy as ordered.  She will follow-up in 2 weeks to review her lab work and for a reevaluation of her shoulder.    Class 1 obesity due to excess calories without serious comorbidity with body mass index (BMI) of 31.0 to 31.9 in adult  Body mass index is 31.74 kg/m². Patient has been diagnosed with obesity and again has been counseled on caloric and carbohydrate restriction along with increasing exercise to 30 minutes 5 or more times a week.      Patient Active Problem List    Diagnosis Date Noted    Strain of right shoulder 07/10/2023    Deltoid tendonitis of right shoulder 07/10/2023    Routine physical examination 07/10/2023    Class 1 obesity due to excess calories without serious comorbidity with body mass index (BMI) of 31.0 to 31.9 in adult 07/10/2023       No current outpatient medications on file.     No current facility-administered medications for this visit.       Allergies as of 07/10/2023    (No Known Allergies)        Social History     Socioeconomic History    Marital status: Single     Spouse name: Not on file    Number of children: 0    Years of education: Not on file    Highest education level: High school graduate   Occupational History    Occupation:      Employer: [employer]   Tobacco Use    Smoking status: Former     Packs/day: 0.10     Years: 1.00     Pack years: 0.10     Types: Cigarettes     Quit date:      Years since quittin.5    Smokeless tobacco:  Never   Vaping Use    Vaping Use: Never used   Substance and Sexual Activity    Alcohol use: Never    Drug use: Never    Sexual activity: Not Currently     Partners: Male     Birth control/protection: None   Other Topics Concern    Not on file   Social History Narrative    Not on file     Social Determinants of Health     Financial Resource Strain: Not on file   Food Insecurity: Not on file   Transportation Needs: Not on file   Physical Activity: Not on file   Stress: Not on file   Social Connections: Not on file   Intimate Partner Violence: Not on file   Housing Stability: Not on file       Family History   Problem Relation Age of Onset    No Known Problems Mother     Alcohol abuse Father     Drug abuse Father     No Known Problems Sister     No Known Problems Brother     No Known Problems Brother     Diabetes Maternal Grandmother     Cancer Maternal Grandmother         Uterine    No Known Problems Maternal Grandfather     Hyperlipidemia Paternal Grandmother     Hypertension Paternal Grandmother     Heart Disease Paternal Grandmother     Heart Attack Paternal Grandmother     Lung Disease Paternal Grandfather     Psychiatric Illness Neg Hx        History reviewed. No pertinent surgical history.    Review of Systems:   Constitutional: Negative for fever, chills, weight loss and malaise/fatigue.   HENT: Negative for ear pain, nosebleeds, congestion, sore throat and neck pain.    Eyes: Negative for blurred vision.   Respiratory: Negative for cough, sputum production, shortness of breath and wheezing.    Cardiovascular: Negative for chest pain, palpitations, orthopnea and leg swelling.   Gastrointestinal: Negative for heartburn, nausea, vomiting and abdominal pain.   Genitourinary: Negative for dysuria, urgency and frequency.   Musculoskeletal: Negative for myalgias, back pain and joint pain.   Skin: Negative for rash and itching.   Neurological: Negative for dizziness, tingling, tremors, sensory change, focal weakness  "and headaches.   Endo/Heme/Allergies: Does not bruise/bleed easily.   Psychiatric/Behavioral: Negative for depression, suicidal ideas and memory loss.  The patient is not nervous/anxious and does not have insomnia.    All other systems reviewed and are negative except as in HPI.    Wt Readings from Last 3 Encounters:   07/10/23 76.2 kg (168 lb)   06/27/23 74.8 kg (165 lb)   06/05/23 75.3 kg (166 lb)   ]    Exam:  /72   Pulse 98   Temp 36.6 °C (97.8 °F) (Temporal)   Resp 16   Ht 1.549 m (5' 1\")   Wt 76.2 kg (168 lb)   SpO2 96%  Body mass index is 31.74 kg/m².   General:  Well nourished, well developed female. No apparent distress. Not ill appearing.  Eyes: EOM intact, PERRL, conjunctiva non-injected, sclera non-icteric.  Neck: Supple with no cervical lymphadenopathy, JVD, palpable thyroid nodules or carotid bruits.  Pulmonary: Clear to ausculation bilaterally. Normal effort. No rales, rhonchi, or wheezing.  Cardiovascular: Regular rate and rhythm without murmur, rub or gallop.   Extremities: Full range of motion. Warm and well perfused with no edema.  Skin: Intact with no obvious rashes or lesions.  Neuro: Cranial nerves I-XII grossly intact.  Psych: Alert and oriented x 3.  Appropriately dressed. Mood and affect appropriate.    Please note that this dictation was created using voice recognition software. I have made every reasonable attempt to correct obvious errors, but I expect that there are errors of grammar and possibly content that I did not discover before finalizing the note.    "

## 2023-07-11 NOTE — ASSESSMENT & PLAN NOTE
Patient here today to establish care. Due for routine fasting labs. Will follow up in 2 weeks with results.  Patient states no previous PCP. No medical records requested.

## 2023-07-11 NOTE — ASSESSMENT & PLAN NOTE
Body mass index is 31.74 kg/m². Patient has been diagnosed with obesity and again has been counseled on caloric and carbohydrate restriction along with increasing exercise to 30 minutes 5 or more times a week.

## 2023-07-11 NOTE — ASSESSMENT & PLAN NOTE
Patient was initially seen for this condition on 5/22/2023 in the urgent care.  At that time she stated that this was not a Workmen's Comp. issue.  She states that this pain of her right shoulder started approximately 03/23 after she was working on a machine where the door slides up. The door was really heavy and she was lifting it by herself. She states the pain has improved but only because she has been resting.  She states that there was a tightness, soreness, achy, pain with any repetitive movement.  Pain was exacerbated with abduction of arm lateral from body, and with lifting.  She does occasionally hear a popping sound in her shoulder still if she lifts it above the height of her shoulder.  She denies numbness and tingling.  She is taking OTC ibuprofen.  She has been doing ice, heat, KT tape, and a TENS unit. Patient states that she has not returned to work since 05/25/23. She brings in [facility] paperwork for completion today. She states that her pain is much better than it was in May but that she still gets pain when lifting heavy things, such as her nephew who weighs 65 pounds. She did not attend the physical therapy that was prescribed because the Workmen's Comp. claim was denied and she was afraid that the insurance would not cover physical therapy.  I have instructed her to call today and make the appointment for physical therapy as it will be covered by her insurance.  We will release her back to work with a 50 pound weight restriction.  With instructions that anything over 50 pounds requires a team lift for her.  She states she should be able to go back to work with this accommodation in her regular work area.  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  She continues to take ibuprofen to help control her discomfort.  Shoulder exam today was unable to reproduce her previous pain.  Her strength is 5/5 bilaterally.  This information has been noted on her [facility]  paperwork along with her need to complete her physical therapy as ordered.  She will follow-up in 2 weeks to review her lab work and for a reevaluation of her shoulder.

## 2023-07-13 ENCOUNTER — HOSPITAL ENCOUNTER (OUTPATIENT)
Dept: LAB | Facility: MEDICAL CENTER | Age: 18
End: 2023-07-13
Attending: PHYSICIAN ASSISTANT
Payer: COMMERCIAL

## 2023-07-13 DIAGNOSIS — Z00.00 ROUTINE PHYSICAL EXAMINATION: ICD-10-CM

## 2023-07-13 DIAGNOSIS — E55.9 VITAMIN D DEFICIENCY: ICD-10-CM

## 2023-07-13 LAB
25(OH)D3 SERPL-MCNC: 26 NG/ML (ref 30–100)
ALBUMIN SERPL BCP-MCNC: 4.6 G/DL (ref 3.2–4.9)
ALBUMIN/GLOB SERPL: 1.9 G/DL
ALP SERPL-CCNC: 87 U/L (ref 30–99)
ALT SERPL-CCNC: 20 U/L (ref 2–50)
ANION GAP SERPL CALC-SCNC: 10 MMOL/L (ref 7–16)
AST SERPL-CCNC: 15 U/L (ref 12–45)
BASOPHILS # BLD AUTO: 0.7 % (ref 0–1.8)
BASOPHILS # BLD: 0.05 K/UL (ref 0–0.12)
BILIRUB SERPL-MCNC: 0.8 MG/DL (ref 0.1–1.5)
BUN SERPL-MCNC: 7 MG/DL (ref 8–22)
CALCIUM ALBUM COR SERPL-MCNC: 8.5 MG/DL (ref 8.5–10.5)
CALCIUM SERPL-MCNC: 9 MG/DL (ref 8.5–10.5)
CHLORIDE SERPL-SCNC: 106 MMOL/L (ref 96–112)
CO2 SERPL-SCNC: 23 MMOL/L (ref 20–33)
CREAT SERPL-MCNC: 0.73 MG/DL (ref 0.5–1.4)
EOSINOPHIL # BLD AUTO: 0.04 K/UL (ref 0–0.51)
EOSINOPHIL NFR BLD: 0.6 % (ref 0–6.9)
ERYTHROCYTE [DISTWIDTH] IN BLOOD BY AUTOMATED COUNT: 43.4 FL (ref 35.9–50)
FASTING STATUS PATIENT QL REPORTED: NORMAL
GFR SERPLBLD CREATININE-BSD FMLA CKD-EPI: 120 ML/MIN/1.73 M 2
GLOBULIN SER CALC-MCNC: 2.4 G/DL (ref 1.9–3.5)
GLUCOSE SERPL-MCNC: 84 MG/DL (ref 65–99)
HCT VFR BLD AUTO: 40.7 % (ref 37–47)
HGB BLD-MCNC: 13.4 G/DL (ref 12–16)
IMM GRANULOCYTES # BLD AUTO: 0.03 K/UL (ref 0–0.11)
IMM GRANULOCYTES NFR BLD AUTO: 0.4 % (ref 0–0.9)
LYMPHOCYTES # BLD AUTO: 2.34 K/UL (ref 1–4.8)
LYMPHOCYTES NFR BLD: 34.6 % (ref 22–41)
MCH RBC QN AUTO: 31.5 PG (ref 27–33)
MCHC RBC AUTO-ENTMCNC: 32.9 G/DL (ref 32.2–35.5)
MCV RBC AUTO: 95.5 FL (ref 81.4–97.8)
MONOCYTES # BLD AUTO: 0.44 K/UL (ref 0–0.85)
MONOCYTES NFR BLD AUTO: 6.5 % (ref 0–13.4)
NEUTROPHILS # BLD AUTO: 3.87 K/UL (ref 1.82–7.42)
NEUTROPHILS NFR BLD: 57.2 % (ref 44–72)
NRBC # BLD AUTO: 0 K/UL
NRBC BLD-RTO: 0 /100 WBC (ref 0–0.2)
PLATELET # BLD AUTO: 292 K/UL (ref 164–446)
PMV BLD AUTO: 9.3 FL (ref 9–12.9)
POTASSIUM SERPL-SCNC: 4.4 MMOL/L (ref 3.6–5.5)
PROT SERPL-MCNC: 7 G/DL (ref 6–8.2)
RBC # BLD AUTO: 4.26 M/UL (ref 4.2–5.4)
SODIUM SERPL-SCNC: 139 MMOL/L (ref 135–145)
WBC # BLD AUTO: 6.8 K/UL (ref 4.8–10.8)

## 2023-07-13 PROCEDURE — 36415 COLL VENOUS BLD VENIPUNCTURE: CPT

## 2023-07-13 PROCEDURE — 82306 VITAMIN D 25 HYDROXY: CPT

## 2023-07-13 PROCEDURE — 85025 COMPLETE CBC W/AUTO DIFF WBC: CPT

## 2023-07-13 PROCEDURE — 80053 COMPREHEN METABOLIC PANEL: CPT

## 2023-07-25 ENCOUNTER — OFFICE VISIT (OUTPATIENT)
Dept: MEDICAL GROUP | Facility: CLINIC | Age: 18
End: 2023-07-25
Payer: COMMERCIAL

## 2023-07-25 VITALS
TEMPERATURE: 99.5 F | BODY MASS INDEX: 31.95 KG/M2 | RESPIRATION RATE: 18 BRPM | HEART RATE: 99 BPM | WEIGHT: 169.2 LBS | OXYGEN SATURATION: 98 % | SYSTOLIC BLOOD PRESSURE: 104 MMHG | HEIGHT: 61 IN | DIASTOLIC BLOOD PRESSURE: 70 MMHG

## 2023-07-25 DIAGNOSIS — Z00.00 ROUTINE PHYSICAL EXAMINATION: ICD-10-CM

## 2023-07-25 DIAGNOSIS — E55.9 VITAMIN D DEFICIENCY: ICD-10-CM

## 2023-07-25 DIAGNOSIS — M77.8 DELTOID TENDONITIS OF RIGHT SHOULDER: ICD-10-CM

## 2023-07-25 PROCEDURE — 99213 OFFICE O/P EST LOW 20 MIN: CPT | Performed by: PHYSICIAN ASSISTANT

## 2023-07-25 PROCEDURE — 3078F DIAST BP <80 MM HG: CPT | Performed by: PHYSICIAN ASSISTANT

## 2023-07-25 PROCEDURE — 3074F SYST BP LT 130 MM HG: CPT | Performed by: PHYSICIAN ASSISTANT

## 2023-07-25 ASSESSMENT — FIBROSIS 4 INDEX: FIB4 SCORE: 0.23

## 2023-07-25 NOTE — PROGRESS NOTES
Chief Complaint   Patient presents with    Results     Labs     Paperwork       HISTORY OF PRESENT ILLNESS: Patient is a 18 y.o. female established patient who presents today to discuss the following issues:    Assessment/Plan  Routine physical examination  Patient presents to clinic today to review recent lab work.  Labs are within normal limits except as noted below.  Repeat labs in 1 year.    Vitamin D deficiency  Labs show patient is low on vitamin D. Current vitamin D level is 26. Start taking vitamin D3 2000 units per day. Recheck labs in one year.    Deltoid tendonitis of right shoulder  Patient is here today with request for release to return to work.  She had a deltoid tendinitis of her right shoulder due to overuse.  She has been taking anti-inflammatories and doing physical therapy with good progression of her symptoms.  She feels that she is able to return to work as long as she can complete her physical therapy.  She states the physical therapy has made a tremendous difference in how her shoulder feels when she would like to continue her treatment.  Paperwork completed with a return to work date with time off as needed to complete her physical therapy.  She will follow-up with our office should there be recurrence of symptoms.      Reviewed risks and benefits of treatment plan. Patient verbally agrees to plan of care.     Patient Active Problem List    Diagnosis Date Noted    Vitamin D deficiency 08/18/2023    Strain of right shoulder 07/10/2023    Deltoid tendonitis of right shoulder 07/10/2023    Routine physical examination 07/10/2023    Class 1 obesity due to excess calories without serious comorbidity with body mass index (BMI) of 31.0 to 31.9 in adult 07/10/2023       Allergies:Patient has no known allergies.    No current outpatient medications on file.     No current facility-administered medications for this visit.       Wt Readings from Last 3 Encounters:   07/25/23 76.7 kg (169 lb 3.2 oz)  "  07/10/23 76.2 kg (168 lb)   06/27/23 74.8 kg (165 lb)   ]  Patient's last menstrual period was 07/14/2023 (exact date).    Exam:  /70 (BP Location: Right arm, Patient Position: Sitting, BP Cuff Size: Adult long)   Pulse 99   Temp 37.5 °C (99.5 °F) (Temporal)   Resp 18   Ht 1.549 m (5' 1\")   Wt 76.7 kg (169 lb 3.2 oz)   SpO2 98%  Body mass index is 31.97 kg/m².   General:  Well nourished, well developed female. No apparent distress. Not ill appearing.  Eyes: EOM intact, PERRL, conjunctiva non-injected, sclera non-icteric.  Neck: Supple with no cervical lymphadenopathy, JVD, palpable thyroid nodules or carotid bruits.  Pulmonary: Clear to ausculation bilaterally. Normal effort. No rales, rhonchi, or wheezing.  Cardiovascular: Regular rate and rhythm without murmur, rub or gallop.   Extremities: Full range of motion. Warm and well perfused with no edema.  Skin: Intact with no obvious rashes or lesions.  Neuro: Cranial nerves I-XII grossly intact.  Psych: Alert and oriented x 3.  Appropriately dressed. Mood and affect appropriate.    Return if symptoms worsen or fail to improve.  Please note that this dictation was created using voice recognition software. I have made every reasonable attempt to correct obvious errors, but I expect that there are errors of grammar and possibly content that I did not discover before finalizing the note.    "

## 2023-08-18 PROBLEM — E55.9 VITAMIN D DEFICIENCY: Status: ACTIVE | Noted: 2023-08-18

## 2023-08-19 NOTE — ASSESSMENT & PLAN NOTE
Patient presents to clinic today to review recent lab work.  Labs are within normal limits except as noted below.  Repeat labs in 1 year.

## 2023-08-19 NOTE — ASSESSMENT & PLAN NOTE
Patient is here today with request for release to return to work.  She had a deltoid tendinitis of her right shoulder due to overuse.  She has been taking anti-inflammatories and doing physical therapy with good progression of her symptoms.  She feels that she is able to return to work as long as she can complete her physical therapy.  She states the physical therapy has made a tremendous difference in how her shoulder feels when she would like to continue her treatment.  Paperwork completed with a return to work date with time off as needed to complete her physical therapy.  She will follow-up with our office should there be recurrence of symptoms.

## 2023-08-19 NOTE — ASSESSMENT & PLAN NOTE
Labs show patient is low on vitamin D. Current vitamin D level is 26. Start taking vitamin D3 2000 units per day. Recheck labs in one year.

## 2023-09-20 ENCOUNTER — DOCUMENTATION (OUTPATIENT)
Dept: HEALTH INFORMATION MANAGEMENT | Facility: OTHER | Age: 18
End: 2023-09-20
Payer: COMMERCIAL

## 2024-01-31 ENCOUNTER — TELEPHONE (OUTPATIENT)
Dept: HEALTH INFORMATION MANAGEMENT | Facility: OTHER | Age: 19
End: 2024-01-31
Payer: COMMERCIAL

## 2024-02-03 ENCOUNTER — OFFICE VISIT (OUTPATIENT)
Dept: URGENT CARE | Facility: PHYSICIAN GROUP | Age: 19
End: 2024-02-03
Payer: COMMERCIAL

## 2024-02-03 VITALS
DIASTOLIC BLOOD PRESSURE: 74 MMHG | OXYGEN SATURATION: 98 % | BODY MASS INDEX: 31.1 KG/M2 | RESPIRATION RATE: 16 BRPM | HEIGHT: 62 IN | TEMPERATURE: 97.3 F | WEIGHT: 169 LBS | SYSTOLIC BLOOD PRESSURE: 110 MMHG | HEART RATE: 93 BPM

## 2024-02-03 DIAGNOSIS — J00 COMMON COLD: ICD-10-CM

## 2024-02-03 PROCEDURE — 3078F DIAST BP <80 MM HG: CPT | Performed by: FAMILY MEDICINE

## 2024-02-03 PROCEDURE — 3074F SYST BP LT 130 MM HG: CPT | Performed by: FAMILY MEDICINE

## 2024-02-03 PROCEDURE — 99213 OFFICE O/P EST LOW 20 MIN: CPT | Performed by: FAMILY MEDICINE

## 2024-02-03 ASSESSMENT — FIBROSIS 4 INDEX: FIB4 SCORE: 0.24

## 2024-02-03 NOTE — PROGRESS NOTES
"       CC:  cough        Cough  This is a new problem. The current episode started 4 days ago. The problem has been unchanged. The problem occurs constantly. The cough is productive.        Pt denies:  sore throat,  fatigue, muscle aches, fever. Pertinent negatives include no   headaches, nausea, vomiting, diarrhea, sweats, weight loss or wheezing. Nothing aggravates the symptoms.  Patient has tried nothing for the symptoms. There is no history of asthma.        No past medical history on file.      Social History     Tobacco Use    Smoking status: Former     Current packs/day: 0.00     Average packs/day: 0.1 packs/day for 1 year (0.1 ttl pk-yrs)     Types: Cigarettes     Start date: 2020     Quit date: 2021     Years since quitting: 3.0    Smokeless tobacco: Never   Vaping Use    Vaping Use: Never used   Substance Use Topics    Alcohol use: Never    Drug use: Never         No current outpatient medications on file prior to visit.     No current facility-administered medications on file prior to visit.                    Review of Systems   Constitutional: Negative for fever and weight loss.   HENT: negative for otalgia  Cardiovascular - denies chest pain or dyspnea  Respiratory: Positive for cough.  .  Negative for wheezing.    Neurological: Negative for headaches.   GI - denies nausea, vomiting or diarrhea  Neuro - denies numbness or tingling.            Objective:     /74   Pulse 93   Temp 36.3 °C (97.3 °F) (Temporal)   Resp 16   Ht 1.575 m (5' 2\")   Wt 76.7 kg (169 lb)   SpO2 98%     Physical Exam   Constitutional: patient is oriented to person, place, and time. Patient appears well-developed and well-nourished. No distress.   HENT:   Head: Normocephalic and atraumatic.   Right Ear: External ear normal.   Left Ear: External ear normal.   Nose: Mucosal edema  present. Right sinus exhibits no maxillary sinus tenderness. Left sinus exhibits no maxillary sinus tenderness.   Mouth/Throat: Mucous membranes " are normal. No oral lesions.  No posterior pharyngeal erythema.  No oropharyngeal exudate or posterior oropharyngeal edema.   Eyes: Conjunctivae and EOM are normal. Pupils are equal, round, and reactive to light. Right eye exhibits no discharge. Left eye exhibits no discharge. No scleral icterus.   Neck: Normal range of motion. Neck supple. No tracheal deviation present.   Cardiovascular: Normal rate, regular rhythm and normal heart sounds.  Exam reveals no friction rub.    Pulmonary/Chest: Effort normal. No respiratory distress. Patient has no wheezes or rhonchi. Patient has no rales.    Musculoskeletal:  exhibits no edema.   Lymphadenopathy:     Patient has no cervical adenopathy.      Neurological: patient is alert and oriented to person, place, and time.   Skin: Skin is warm and dry. No rash noted. No erythema.   Psychiatric: patient  has a normal mood and affect.  behavior is normal.   Nursing note and vitals reviewed.              Assessment/Plan:             1. Cough  Likely minor viral illnes (common cold)      Follow up in one week if no improvement, sooner if symptoms worsen.

## 2024-02-03 NOTE — LETTER
February 3, 2024         Patient: Cathleen Dean   YOB: 2005   Date of Visit: 2/3/2024           To Whom it May Concern:    Cathleen Dean was seen in my clinic on 2/3/2024. She may return to work on Wednesday.         Please excuse recent absence due to illness.   .    If you have any questions or concerns, please don't hesitate to call.        Sincerely,           Flavio Dudley M.D.  Electronically Signed